# Patient Record
Sex: FEMALE | Race: WHITE | Employment: OTHER | URBAN - METROPOLITAN AREA
[De-identification: names, ages, dates, MRNs, and addresses within clinical notes are randomized per-mention and may not be internally consistent; named-entity substitution may affect disease eponyms.]

---

## 2018-06-13 ENCOUNTER — HOSPITAL ENCOUNTER (OUTPATIENT)
Dept: MRI IMAGING | Age: 68
Discharge: HOME | End: 2018-06-13
Attending: PHYSICIAN ASSISTANT
Payer: COMMERCIAL

## 2018-06-13 DIAGNOSIS — M54.5 LEFT LOW BACK PAIN, UNSPECIFIED CHRONICITY, WITH SCIATICA PRESENCE UNSPECIFIED: ICD-10-CM

## 2018-06-13 DIAGNOSIS — M43.16 SPONDYLOLISTHESIS, LUMBAR REGION: ICD-10-CM

## 2018-06-13 DIAGNOSIS — M47.817 SPONDYLOSIS OF LUMBOSACRAL REGION WITHOUT MYELOPATHY OR RADICULOPATHY: ICD-10-CM

## 2018-06-13 DIAGNOSIS — Z98.890 HISTORY OF LUMBAR LAMINECTOMY FOR SPINAL CORD DECOMPRESSION: ICD-10-CM

## 2018-06-13 PROCEDURE — 72158 MRI LUMBAR SPINE W/O & W/DYE: CPT | Performed by: PHYSICIAN ASSISTANT

## 2018-06-13 PROCEDURE — A9576 INJ PROHANCE MULTIPACK: HCPCS | Performed by: PHYSICIAN ASSISTANT

## 2018-07-23 PROBLEM — M43.16 SPONDYLOLISTHESIS AT L4-L5 LEVEL: Status: ACTIVE | Noted: 2018-07-23

## 2018-07-23 PROBLEM — Z98.890 HISTORY OF LUMBAR LAMINECTOMY: Status: ACTIVE | Noted: 2018-07-23

## 2018-07-23 PROBLEM — M51.36 DDD (DEGENERATIVE DISC DISEASE), LUMBAR: Status: ACTIVE | Noted: 2018-07-23

## 2018-07-23 PROBLEM — M48.062 LUMBAR STENOSIS WITH NEUROGENIC CLAUDICATION: Status: ACTIVE | Noted: 2018-07-23

## 2021-04-11 DIAGNOSIS — Z23 NEED FOR VACCINATION: ICD-10-CM

## 2021-08-19 PROBLEM — E78.5 HYPERLIPIDEMIA: Status: ACTIVE | Noted: 2021-08-19

## 2021-09-10 PROBLEM — D09.9 SQUAMOUS CELL CARCINOMA IN SITU: Status: ACTIVE | Noted: 2021-09-10

## 2021-09-10 PROBLEM — I10 HYPERTENSION, UNSPECIFIED TYPE: Status: ACTIVE | Noted: 2021-09-10

## 2021-09-10 PROBLEM — H35.3190 NONEXUDATIVE AGE-RELATED MACULAR DEGENERATION: Status: ACTIVE | Noted: 2021-09-10

## 2021-09-10 PROBLEM — H47.333 PSEUDOPAPILLEDEMA OF BOTH OPTIC DISCS: Status: ACTIVE | Noted: 2021-09-10

## 2022-04-20 ENCOUNTER — OFFICE VISIT (OUTPATIENT)
Dept: UROLOGY | Facility: CLINIC | Age: 72
End: 2022-04-20
Attending: OBSTETRICS & GYNECOLOGY
Payer: MEDICARE

## 2022-04-20 VITALS — WEIGHT: 190 LBS | BODY MASS INDEX: 32.44 KG/M2 | HEIGHT: 64 IN | TEMPERATURE: 97 F

## 2022-04-20 DIAGNOSIS — Z87.440 HISTORY OF RECURRENT UTI (URINARY TRACT INFECTION): Primary | ICD-10-CM

## 2022-04-20 DIAGNOSIS — N95.2 VAGINAL ATROPHY: ICD-10-CM

## 2022-04-20 DIAGNOSIS — L30.9 VULVAR DERMATITIS: ICD-10-CM

## 2022-04-20 LAB
BLOOD URINE: NEGATIVE
CONTROL RUN WITHIN 24 HOURS?: YES
NITRITE URINE: NEGATIVE

## 2022-04-20 PROCEDURE — 87077 CULTURE AEROBIC IDENTIFY: CPT | Performed by: OBSTETRICS & GYNECOLOGY

## 2022-04-20 PROCEDURE — 87186 SC STD MICRODIL/AGAR DIL: CPT | Performed by: OBSTETRICS & GYNECOLOGY

## 2022-04-20 PROCEDURE — 87086 URINE CULTURE/COLONY COUNT: CPT | Performed by: OBSTETRICS & GYNECOLOGY

## 2022-04-20 PROCEDURE — 51701 INSERT BLADDER CATHETER: CPT | Performed by: OBSTETRICS & GYNECOLOGY

## 2022-04-20 PROCEDURE — 99202 OFFICE O/P NEW SF 15 MIN: CPT

## 2022-04-20 PROCEDURE — 81002 URINALYSIS NONAUTO W/O SCOPE: CPT | Performed by: OBSTETRICS & GYNECOLOGY

## 2022-04-20 RX ORDER — ESTRADIOL 0.1 MG/G
CREAM VAGINAL
Qty: 442.5 G | Refills: 0 | Status: SHIPPED | OUTPATIENT
Start: 2022-04-20

## 2022-04-20 NOTE — PATIENT INSTRUCTIONS
1. Discontinue all contact irritants. No perfume or dye detergents. OK to use Free and Clear or dye free detergents. 2. Use Dove sensitive or other pH neutral soaps. 3. Daily soaks with baking soda or x 3 per week. 4. Pad dry no aggressive cleansing or wash cloth. 5. Olive oil or coconut oil as needed for itching. 6. Mycolog (Triamcinolone + nystatin) twice per day. 7. Discontinue clobetasol. 8. No underwear at night. 9. Continue to take Vitamin D and B12 on a regular basis  10. Will start on Estrace vaginal cream to use per vagina x 3 per week.

## 2022-04-26 ENCOUNTER — TELEPHONE (OUTPATIENT)
Dept: UROLOGY | Facility: CLINIC | Age: 72
End: 2022-04-26

## 2022-04-26 RX ORDER — NITROFURANTOIN 25; 75 MG/1; MG/1
100 CAPSULE ORAL 2 TIMES DAILY
Qty: 14 CAPSULE | Refills: 0 | Status: SHIPPED | OUTPATIENT
Start: 2022-04-26

## 2022-04-26 NOTE — TELEPHONE ENCOUNTER
Left message state patient has an infection and TORB Dr Sobia Roth Macrobid 100mg BID for 7 days dispense 14 tabs. Patient to call confirmation of phone call.

## 2022-06-22 ENCOUNTER — TELEPHONE (OUTPATIENT)
Dept: UROLOGY | Facility: CLINIC | Age: 72
End: 2022-06-22

## (undated) DIAGNOSIS — N39.0 UTI (URINARY TRACT INFECTION): ICD-10-CM

## (undated) DIAGNOSIS — Z87.440 HISTORY OF RECURRENT UTI (URINARY TRACT INFECTION): Primary | ICD-10-CM